# Patient Record
Sex: FEMALE | Race: WHITE | NOT HISPANIC OR LATINO
[De-identification: names, ages, dates, MRNs, and addresses within clinical notes are randomized per-mention and may not be internally consistent; named-entity substitution may affect disease eponyms.]

---

## 2018-08-15 ENCOUNTER — APPOINTMENT (OUTPATIENT)
Dept: BREAST CENTER | Facility: CLINIC | Age: 38
End: 2018-08-15
Payer: COMMERCIAL

## 2018-08-15 VITALS
SYSTOLIC BLOOD PRESSURE: 101 MMHG | HEART RATE: 69 BPM | DIASTOLIC BLOOD PRESSURE: 73 MMHG | WEIGHT: 134 LBS | BODY MASS INDEX: 24.66 KG/M2 | TEMPERATURE: 99.2 F | HEIGHT: 62 IN

## 2018-08-15 DIAGNOSIS — Z78.9 OTHER SPECIFIED HEALTH STATUS: ICD-10-CM

## 2018-08-15 PROCEDURE — 99203 OFFICE O/P NEW LOW 30 MIN: CPT

## 2019-11-27 ENCOUNTER — APPOINTMENT (OUTPATIENT)
Dept: BREAST CENTER | Facility: CLINIC | Age: 39
End: 2019-11-27
Payer: COMMERCIAL

## 2019-11-27 DIAGNOSIS — Z80.3 FAMILY HISTORY OF MALIGNANT NEOPLASM OF BREAST: ICD-10-CM

## 2019-11-27 DIAGNOSIS — Z80.7 FAMILY HISTORY OF OTHER MALIGNANT NEOPLASMS OF LYMPHOID, HEMATOPOIETIC AND RELATED TISSUES: ICD-10-CM

## 2019-11-27 DIAGNOSIS — Z80.1 FAMILY HISTORY OF MALIGNANT NEOPLASM OF TRACHEA, BRONCHUS AND LUNG: ICD-10-CM

## 2019-11-27 PROCEDURE — 99203 OFFICE O/P NEW LOW 30 MIN: CPT

## 2019-11-27 NOTE — DATA REVIEWED
[FreeTextEntry1] : 7/10/18 (R) b/l screening mammo/US showing heterogeneously dense breasts, right 12:00 periareolar 1cmFN a hypoechoic mass 6 x 3 x 6mm recommended for US guided core biopsy. Left 7:00 3cmFN showing a 6 x 3 x 6mm cyst. Left medial superior asymmetry for which spot compression views recommended and targeted US prior to biopsy. BIRADS 4 \par \par 7/24/18 (R) left dx mammo/US showing previously noted focal asymmetry blends with background parenchyma, left breast US showing no discrete mass identified, negative BIRADS 1 \par \par 7/26/18 (R) right breast US core biopsy 12:00 PA pathology consistent with nodular stromal fibrosis, concordant with imaging, recommended 6 month follow up right breast US. \par  \par

## 2019-11-27 NOTE — HISTORY OF PRESENT ILLNESS
[FreeTextEntry1] : 39 year old pre-menopausal female, previously seen by Dr. Savanah Ortiz in 8/2018 presents for consultation regarding follow up after benign biopsy completed in July 2018. DAVID of 20.2% Patient has no breast complaints. Patient is a year past due for 6 month follow up ultrasound of this area. \par \par Discussed patient's high risk status likely due to nulliparity, dense breasts and family hx, patient would like to be followed in high risk program. She is hesitant about MRIs. \par \par \par \par \par

## 2019-11-27 NOTE — PAST MEDICAL HISTORY
[Menstruating] : The patient is menstruating [Menarche Age ____] : age at menarche was [unfilled] [Definite ___ (Date)] : the last menstrual period was [unfilled] [Total Preg ___] : G[unfilled] [Live Births ___] : P[unfilled]  [FreeTextEntry5] : cervical biopsy in 2005 (reportedly benign results) [FreeTextEntry6] : NA [FreeTextEntry7] : OCP use x 20 years  [FreeTextEntry8] : NA

## 2020-01-23 ENCOUNTER — CHART COPY (OUTPATIENT)
Age: 40
End: 2020-01-23

## 2020-01-26 ENCOUNTER — TRANSCRIPTION ENCOUNTER (OUTPATIENT)
Age: 40
End: 2020-01-26

## 2020-06-26 ENCOUNTER — APPOINTMENT (OUTPATIENT)
Dept: BREAST CENTER | Facility: CLINIC | Age: 40
End: 2020-06-26

## 2020-11-02 ENCOUNTER — APPOINTMENT (OUTPATIENT)
Dept: BREAST CENTER | Facility: CLINIC | Age: 40
End: 2020-11-02
Payer: COMMERCIAL

## 2020-11-02 VITALS
RESPIRATION RATE: 16 BRPM | HEIGHT: 62 IN | TEMPERATURE: 97.8 F | SYSTOLIC BLOOD PRESSURE: 129 MMHG | HEART RATE: 88 BPM | DIASTOLIC BLOOD PRESSURE: 83 MMHG

## 2020-11-02 PROCEDURE — 99072 ADDL SUPL MATRL&STAF TM PHE: CPT

## 2020-11-02 PROCEDURE — 99214 OFFICE O/P EST MOD 30 MIN: CPT

## 2020-11-04 NOTE — DATA REVIEWED
[FreeTextEntry1] : 7/10/18 (R) b/l screening mammo/US showing heterogeneously dense breasts, right 12:00 periareolar 1cmFN a hypoechoic mass 6 x 3 x 6mm recommended for US guided core biopsy. Left 7:00 3cmFN showing a 6 x 3 x 6mm cyst. Left medial superior asymmetry for which spot compression views recommended and targeted US prior to biopsy. BIRADS 4 \par \par 7/24/18 (R) left dx mammo/US showing previously noted focal asymmetry blends with background parenchyma, left breast US showing no discrete mass identified, negative BI-RADS 1 \par \par 7/26/18 (R) right breast US core biopsy 12:00 PA pathology consistent with nodular stromal fibrosis, concordant with imaging, recommended 6 month follow up right breast US. \par \par -- 12/13/19 (R) right breast US: stable right 12:00 biopsied mass. BI-RADS 2.

## 2020-11-04 NOTE — HISTORY OF PRESENT ILLNESS
[FreeTextEntry1] : 39 year old pre-menopausal female presents for evaluation benign biopsy completed in July 2018 as well as establishing care for high risk status. Patient has no breast complaints today. Denies nipple discharge, nipple/breast skin changes or dimpling. Denies fever and chills. DAVID of 20.2%\par \par Discussed patients high risk status and recommendations for close surveillance. Patient understands and would like to be followed closely.\par \par Discussed importance and implication of genetic testing in regards to her family history and offspring. Patient would like to think about testing.\par \par Discussed benefits of surveillance and well as implication of the sensitivity of MRI. Patient has hesitations. Decided to go forward with mammo and sono first.\par \par \par \par \par

## 2020-11-16 ENCOUNTER — TRANSCRIPTION ENCOUNTER (OUTPATIENT)
Age: 40
End: 2020-11-16

## 2020-11-30 ENCOUNTER — NON-APPOINTMENT (OUTPATIENT)
Age: 40
End: 2020-11-30

## 2021-02-18 ENCOUNTER — APPOINTMENT (OUTPATIENT)
Dept: INTERNAL MEDICINE | Facility: CLINIC | Age: 41
End: 2021-02-18

## 2021-03-02 ENCOUNTER — APPOINTMENT (OUTPATIENT)
Dept: INTERNAL MEDICINE | Facility: CLINIC | Age: 41
End: 2021-03-02
Payer: COMMERCIAL

## 2021-03-02 VITALS
DIASTOLIC BLOOD PRESSURE: 81 MMHG | HEIGHT: 62 IN | SYSTOLIC BLOOD PRESSURE: 122 MMHG | OXYGEN SATURATION: 98 % | BODY MASS INDEX: 26.13 KG/M2 | WEIGHT: 142 LBS | TEMPERATURE: 98.2 F | HEART RATE: 83 BPM

## 2021-03-02 DIAGNOSIS — Z11.3 ENCOUNTER FOR SCREENING FOR INFECTIONS WITH A PREDOMINANTLY SEXUAL MODE OF TRANSMISSION: ICD-10-CM

## 2021-03-02 DIAGNOSIS — Z86.018 PERSONAL HISTORY OF OTHER BENIGN NEOPLASM: ICD-10-CM

## 2021-03-02 DIAGNOSIS — Z87.2 PERSONAL HISTORY OF DISEASES OF THE SKIN AND SUBCUTANEOUS TISSUE: ICD-10-CM

## 2021-03-02 DIAGNOSIS — Z23 ENCOUNTER FOR IMMUNIZATION: ICD-10-CM

## 2021-03-02 DIAGNOSIS — L29.0 PRURITUS ANI: ICD-10-CM

## 2021-03-02 DIAGNOSIS — Z11.59 ENCOUNTER FOR SCREENING FOR OTHER VIRAL DISEASES: ICD-10-CM

## 2021-03-02 DIAGNOSIS — Z00.00 ENCOUNTER FOR GENERAL ADULT MEDICAL EXAMINATION W/OUT ABNORMAL FINDINGS: ICD-10-CM

## 2021-03-02 DIAGNOSIS — Z83.3 FAMILY HISTORY OF DIABETES MELLITUS: ICD-10-CM

## 2021-03-02 DIAGNOSIS — R63.5 ABNORMAL WEIGHT GAIN: ICD-10-CM

## 2021-03-02 PROCEDURE — 36415 COLL VENOUS BLD VENIPUNCTURE: CPT

## 2021-03-02 PROCEDURE — 99072 ADDL SUPL MATRL&STAF TM PHE: CPT

## 2021-03-02 PROCEDURE — G0008: CPT

## 2021-03-02 PROCEDURE — 99395 PREV VISIT EST AGE 18-39: CPT | Mod: GC,25

## 2021-03-02 PROCEDURE — 90686 IIV4 VACC NO PRSV 0.5 ML IM: CPT

## 2021-03-02 RX ORDER — DESOGESTREL/ETHINYL ESTRADIOL AND ETHINYL ESTRADIOL 21-5 (28)
KIT ORAL
Refills: 0 | Status: DISCONTINUED | COMMUNITY
End: 2021-03-02

## 2021-03-02 RX ORDER — DESOGESTREL AND ETHINYL ESTRADIOL AND ETHINYL ESTRADIOL 21-5 (28)
KIT ORAL
Refills: 0 | Status: ACTIVE | COMMUNITY

## 2021-03-08 LAB
C TRACH RRNA SPEC QL NAA+PROBE: NOT DETECTED
HBV CORE IGG+IGM SER QL: NONREACTIVE
HBV SURFACE AB SER QL: REACTIVE
HBV SURFACE AG SER QL: NONREACTIVE
HCV AB SER QL: NONREACTIVE
HCV S/CO RATIO: 0.17 S/CO
HIV1+2 AB SPEC QL IA.RAPID: NONREACTIVE
N GONORRHOEA RRNA SPEC QL NAA+PROBE: NOT DETECTED
SOURCE AMPLIFICATION: NORMAL
T PALLIDUM AB SER QL IA: NEGATIVE

## 2021-04-07 ENCOUNTER — APPOINTMENT (OUTPATIENT)
Dept: MAMMOGRAPHY | Facility: HOSPITAL | Age: 41
End: 2021-04-07

## 2021-04-07 ENCOUNTER — APPOINTMENT (OUTPATIENT)
Dept: ULTRASOUND IMAGING | Facility: HOSPITAL | Age: 41
End: 2021-04-07

## 2021-05-04 ENCOUNTER — NON-APPOINTMENT (OUTPATIENT)
Age: 41
End: 2021-05-04

## 2021-05-07 ENCOUNTER — APPOINTMENT (OUTPATIENT)
Dept: BREAST CENTER | Facility: CLINIC | Age: 41
End: 2021-05-07
Payer: COMMERCIAL

## 2021-05-07 VITALS — WEIGHT: 142 LBS | HEART RATE: 95 BPM | OXYGEN SATURATION: 99 % | HEIGHT: 62 IN | BODY MASS INDEX: 26.13 KG/M2

## 2021-05-07 PROCEDURE — 99072 ADDL SUPL MATRL&STAF TM PHE: CPT

## 2021-05-07 PROCEDURE — 99213 OFFICE O/P EST LOW 20 MIN: CPT

## 2021-05-10 NOTE — PHYSICAL EXAM
[Normocephalic] : normocephalic [Examined in the supine and seated position] : examined in the supine and seated position [Symmetrical] : symmetrical [No dominant masses] : no dominant masses in right breast  [No dominant masses] : no dominant masses left breast [No Nipple Retraction] : no left nipple retraction [No Nipple Discharge] : no left nipple discharge [Supple] : supple [No Supraclavicular Adenopathy] : no supraclavicular adenopathy [No Thyromegaly] : no thyromegaly [Breast Nipple Inversion] : nipples not inverted [Breast Nipple Retraction] : nipples not retracted [Breast Nipple Flattening] : nipples not flattened [Breast Nipple Fissures] : nipples not fissured [No Axillary Lymphadenopathy] : no left axillary lymphadenopathy [No Edema] : no edema [No Rashes] : no rashes [No Ulceration] : no ulceration [de-identified] : Fibrocystic tissue in the bilateral axillary tail

## 2021-05-10 NOTE — HISTORY OF PRESENT ILLNESS
[FreeTextEntry1] : 40 year old pre-menopausal female presents for high risk screening. Patient has no breast complaints today. Denies palpable lumps, nipple discharge, nipple/breast skin changes or dimpling. Denies fever and chills. \par \par DAVID lifetime risk is 20.2%\par \par She reports that she received the 2nd dose of the COVID vaccination last week.

## 2021-05-10 NOTE — DATA REVIEWED
[FreeTextEntry1] : 7/10/18 (R) b/l screening mammo/US showing heterogeneously dense breasts, right 12:00 periareolar 1cmFN a hypoechoic mass 6 x 3 x 6mm recommended for US guided core biopsy. Left 7:00 3cmFN showing a 6 x 3 x 6mm cyst. Left medial superior asymmetry for which spot compression views recommended and targeted US prior to biopsy. BIRADS 4 \par \par 7/24/18 ([facility]) left dx mammo/US showing previously noted focal asymmetry blends with background parenchyma, left breast US showing no discrete mass identified, negative BI-RADS 1 \par \par 7/26/18 (R) right breast US core biopsy 12:00 PA pathology consistent with nodular stromal fibrosis, concordant with imaging, recommended 6 month follow up right breast US. \par \par -- 12/13/19 (R) right breast US: stable right 12:00 biopsied mass. BI-RADS 2. \par \par -- 11/25/2020 ([facility]) b/l sMmg and US: heterogenously dense breasts. No mmg or US e/o malignancy. BI-RADS 1.

## 2021-05-10 NOTE — ASSESSMENT
[FreeTextEntry1] : 39 yo female presents for high risk screening; she is without complaint. Discussed with the patient the implications for her lifetime risk, which is considered to be at high risk to develop breast cancer over the span of her lifetime and it is recommended that she undergoes high risk screening surveillance to include biannual radiological screening exams with a mammogram and screening MRI. \par \par Reviewed the benefits of genetic testing in the setting of the patients family history, as well the implications of the various results; insurance coverage and pricing was also explained. It was decided to proceed with genetic testing today ([employer]).

## 2021-05-10 NOTE — PAST MEDICAL HISTORY
[Menstruating] : The patient is menstruating [Menarche Age ____] : age at menarche was [unfilled] [Total Preg ___] : G[unfilled] [Live Births ___] : P[unfilled]  [Approximately ___] : the LMP was approximately [unfilled] [FreeTextEntry5] : cervical biopsy in 2005 (reportedly benign results) [FreeTextEntry6] : NA [FreeTextEntry7] : OCP use x 20 years  [FreeTextEntry8] : NA

## 2021-05-10 NOTE — REVIEW OF SYSTEMS
[As Noted in HPI] : as noted in HPI [Negative] : Heme/Lymph [Breast Pain] : no breast pain [Breast Lump] : no breast lump

## 2021-06-03 ENCOUNTER — NON-APPOINTMENT (OUTPATIENT)
Age: 41
End: 2021-06-03

## 2021-06-11 ENCOUNTER — NON-APPOINTMENT (OUTPATIENT)
Age: 41
End: 2021-06-11

## 2021-08-04 ENCOUNTER — NON-APPOINTMENT (OUTPATIENT)
Age: 41
End: 2021-08-04

## 2021-10-12 ENCOUNTER — NON-APPOINTMENT (OUTPATIENT)
Age: 41
End: 2021-10-12

## 2021-11-08 ENCOUNTER — NON-APPOINTMENT (OUTPATIENT)
Age: 41
End: 2021-11-08

## 2021-12-03 ENCOUNTER — APPOINTMENT (OUTPATIENT)
Dept: BREAST CENTER | Facility: CLINIC | Age: 41
End: 2021-12-03

## 2021-12-17 ENCOUNTER — APPOINTMENT (OUTPATIENT)
Dept: BREAST CENTER | Facility: CLINIC | Age: 41
End: 2021-12-17
Payer: COMMERCIAL

## 2021-12-17 VITALS — HEIGHT: 62 IN | WEIGHT: 135 LBS | HEART RATE: 82 BPM | OXYGEN SATURATION: 98 % | BODY MASS INDEX: 24.84 KG/M2

## 2021-12-17 PROCEDURE — 99213 OFFICE O/P EST LOW 20 MIN: CPT

## 2021-12-20 RX ORDER — MULTIVITAMIN
TABLET ORAL
Refills: 0 | Status: ACTIVE | COMMUNITY

## 2021-12-21 NOTE — PHYSICAL EXAM
[Normocephalic] : normocephalic [Supple] : supple [No Supraclavicular Adenopathy] : no supraclavicular adenopathy [No Thyromegaly] : no thyromegaly [Examined in the supine and seated position] : examined in the supine and seated position [Symmetrical] : symmetrical [No dominant masses] : no dominant masses in right breast  [No dominant masses] : no dominant masses left breast [No Nipple Retraction] : no left nipple retraction [No Nipple Discharge] : no left nipple discharge [No Axillary Lymphadenopathy] : no left axillary lymphadenopathy [No Edema] : no edema [No Rashes] : no rashes [No Ulceration] : no ulceration [Breast Nipple Inversion] : nipples not inverted [Breast Nipple Retraction] : nipples not retracted [Breast Nipple Flattening] : nipples not flattened [Breast Nipple Fissures] : nipples not fissured [de-identified] : Fibrocystic tissue in the bilateral axillary tail

## 2021-12-21 NOTE — HISTORY OF PRESENT ILLNESS
[FreeTextEntry1] : 41 year old pre-menopausal female presents for high risk screening. Patient has no breast complaints today. Denies palpable lumps, nipple discharge, nipple/breast skin changes or dimpling. Denies fever and chills. \par \par DAVID lifetime risk is 20.2%\par \par She reports that she received the 2nd dose of the COVID vaccination last week.

## 2021-12-21 NOTE — PAST MEDICAL HISTORY
[Menstruating] : The patient is menstruating [Menarche Age ____] : age at menarche was [unfilled] [Approximately ___] : the LMP was approximately [unfilled] [Total Preg ___] : G[unfilled] [Live Births ___] : P[unfilled]  [FreeTextEntry5] : cervical biopsy in 2005 (reportedly benign results) [FreeTextEntry6] : NA [FreeTextEntry7] : OCP use x 20 years  [FreeTextEntry8] : NA

## 2021-12-21 NOTE — DATA REVIEWED
[FreeTextEntry1] : 7/10/18 (R) b/l screening mammo/US showing heterogeneously dense breasts, right 12:00 periareolar 1cmFN a hypoechoic mass 6 x 3 x 6mm recommended for US guided core biopsy. Left 7:00 3cmFN showing a 6 x 3 x 6mm cyst. Left medial superior asymmetry for which spot compression views recommended and targeted US prior to biopsy. BIRADS 4 \par \par 7/24/18 ([facility]) left dx mammo/US showing previously noted focal asymmetry blends with background parenchyma, left breast US showing no discrete mass identified, negative BI-RADS 1 \par \par 7/26/18 (R) right breast US core biopsy 12:00 PA pathology consistent with nodular stromal fibrosis, concordant with imaging, recommended 6 month follow up right breast US. \par \par -- 12/13/19 ([facility]) right breast US: stable right 12:00 biopsied mass. BI-RADS 2. \par \par -- 11/25/2020 ([facility]) b/l sMmg and US: heterogenously dense breasts. No mmg or US e/o malignancy. BI-RADS 1. \par \par -- 6/7/21 ([facility]) MRI: 6-month f/u MRI is recommended for probably benign findings in both breasts. BI-RADS 3. \par \par -- 11/1/21 ([facility]) b/l mmg & US: heterogenously dense. BI-RADS 1.

## 2022-03-21 ENCOUNTER — APPOINTMENT (OUTPATIENT)
Dept: INTERNAL MEDICINE | Facility: CLINIC | Age: 42
End: 2022-03-21
Payer: COMMERCIAL

## 2022-03-21 VITALS
HEIGHT: 62 IN | OXYGEN SATURATION: 98 % | SYSTOLIC BLOOD PRESSURE: 112 MMHG | TEMPERATURE: 98 F | RESPIRATION RATE: 16 BRPM | DIASTOLIC BLOOD PRESSURE: 80 MMHG

## 2022-03-21 DIAGNOSIS — N63.10 UNSPECIFIED LUMP IN THE RIGHT BREAST, UNSPECIFIED QUADRANT: ICD-10-CM

## 2022-03-21 DIAGNOSIS — J30.2 OTHER SEASONAL ALLERGIC RHINITIS: ICD-10-CM

## 2022-03-21 PROCEDURE — 99395 PREV VISIT EST AGE 18-39: CPT | Mod: 25,GC

## 2022-03-21 PROCEDURE — 36415 COLL VENOUS BLD VENIPUNCTURE: CPT

## 2022-03-21 NOTE — REVIEW OF SYSTEMS
[Nasal Discharge] : nasal discharge [Negative] : Gastrointestinal [Redness] : no redness [Itching] : no itching [Sore Throat] : no sore throat [Postnasal Drip] : no postnasal drip

## 2022-03-21 NOTE — END OF VISIT
[] : Resident [FreeTextEntry3] : Here for annual visit \par Feels well overall, no acute complaints \par Follows with Breast surgeon closely, due for breast MRI in the July m continue with close follow up \par Psoriasis- needs new derm referral, will give a tthis time \par Allergies-  cw prn OTC medications as needed

## 2022-03-21 NOTE — HISTORY OF PRESENT ILLNESS
[de-identified] : This is a 41F with PMHx of R breast fibroadenoma, psoriasis, eczema, who presents for annual visit. Her last visit was 3/2021, for which she came for a psoriasis flare. Since her last visit, she has been following up with her breast surgeon office outpatient and is up to date on her mammograms and MRIs which have been benign. Her only complaint this visit is mild nasal congestion at night for which she intermittently takes sudafed which helps.

## 2022-03-22 LAB
BASOPHILS # BLD AUTO: 0.02 K/UL
BASOPHILS NFR BLD AUTO: 0.3 %
CHOLEST SERPL-MCNC: 179 MG/DL
EOSINOPHIL # BLD AUTO: 0.27 K/UL
EOSINOPHIL NFR BLD AUTO: 4.3 %
HCT VFR BLD CALC: 43.3 %
HDLC SERPL-MCNC: 58 MG/DL
HGB BLD-MCNC: 12.9 G/DL
IMM GRANULOCYTES NFR BLD AUTO: 0.3 %
LDLC SERPL CALC-MCNC: 98 MG/DL
LYMPHOCYTES # BLD AUTO: 2.18 K/UL
LYMPHOCYTES NFR BLD AUTO: 34.7 %
MAN DIFF?: NORMAL
MCHC RBC-ENTMCNC: 28 PG
MCHC RBC-ENTMCNC: 29.8 GM/DL
MCV RBC AUTO: 94.1 FL
MONOCYTES # BLD AUTO: 0.4 K/UL
MONOCYTES NFR BLD AUTO: 6.4 %
NEUTROPHILS # BLD AUTO: 3.4 K/UL
NEUTROPHILS NFR BLD AUTO: 54 %
NONHDLC SERPL-MCNC: 121 MG/DL
PLATELET # BLD AUTO: 320 K/UL
RBC # BLD: 4.6 M/UL
RBC # FLD: 13.9 %
TRIGL SERPL-MCNC: 118 MG/DL
WBC # FLD AUTO: 6.29 K/UL

## 2022-04-11 PROBLEM — Z11.59 SCREENING FOR VIRAL DISEASE: Status: ACTIVE | Noted: 2021-03-02

## 2022-05-16 ENCOUNTER — LABORATORY RESULT (OUTPATIENT)
Age: 42
End: 2022-05-16

## 2022-05-16 ENCOUNTER — APPOINTMENT (OUTPATIENT)
Dept: DERMATOLOGY | Facility: CLINIC | Age: 42
End: 2022-05-16
Payer: COMMERCIAL

## 2022-05-16 DIAGNOSIS — D48.5 NEOPLASM OF UNCERTAIN BEHAVIOR OF SKIN: ICD-10-CM

## 2022-05-16 DIAGNOSIS — D48.7 NEOPLASM OF UNCERTAIN BEHAVIOR OF OTHER SPECIFIED SITES: ICD-10-CM

## 2022-05-16 PROCEDURE — 11102 TANGNTL BX SKIN SINGLE LES: CPT

## 2022-05-16 PROCEDURE — 99204 OFFICE O/P NEW MOD 45 MIN: CPT | Mod: 25

## 2022-05-16 PROCEDURE — 11103 TANGNTL BX SKIN EA SEP/ADDL: CPT | Mod: 59

## 2022-05-16 NOTE — HISTORY OF PRESENT ILLNESS
[FreeTextEntry1] : psoriasis, mole on neck [de-identified] : new patient\par no hx skin cancer\par mole on back of neck entire life, recently at a wedding and irritated by paste\par psoriasis since kid, used to be on shins and elbows now just ankles where rubs when running\par one episode of joint pain in college but not thought to be psoriatic arthritis

## 2022-06-03 ENCOUNTER — NON-APPOINTMENT (OUTPATIENT)
Age: 42
End: 2022-06-03

## 2022-06-13 ENCOUNTER — TRANSCRIPTION ENCOUNTER (OUTPATIENT)
Age: 42
End: 2022-06-13

## 2022-06-20 ENCOUNTER — TRANSCRIPTION ENCOUNTER (OUTPATIENT)
Age: 42
End: 2022-06-20

## 2022-07-15 ENCOUNTER — APPOINTMENT (OUTPATIENT)
Dept: BREAST CENTER | Facility: CLINIC | Age: 42
End: 2022-07-15

## 2022-07-15 VITALS — HEART RATE: 81 BPM | OXYGEN SATURATION: 98 % | HEIGHT: 62 IN | BODY MASS INDEX: 25.21 KG/M2 | WEIGHT: 137 LBS

## 2022-07-15 PROCEDURE — 99213 OFFICE O/P EST LOW 20 MIN: CPT

## 2022-07-15 RX ORDER — COVID-19 MOLECULAR TEST ASSAY
KIT MISCELLANEOUS
Qty: 4 | Refills: 0 | Status: ACTIVE | COMMUNITY
Start: 2022-02-15

## 2022-07-15 NOTE — ASSESSMENT
[FreeTextEntry1] : 40 yo female presents for high risk screening; she is without complaint. Discussed with the patient the implications for her lifetime risk, which is considered to be at high risk to develop breast cancer over the span of her lifetime and it is recommended that she undergoes high risk screening surveillance to include biannual radiological screening exams with a mammogram and screening MRI. She will be due for her screening mammogram/US in November & RTC in 6 months. \par \par She has her annual appointment with her GYN next week. States that [facility] performs breast exams. I reviewed that if her mammo/US in November is benign, she will RTC in 6 months at which point I will order her MRI, and next mammo/US with an annual visit-- alternating biannual breast exams with the GYN. The patient verbalized understanding and is in agreement with the plan.

## 2022-07-15 NOTE — PHYSICAL EXAM
[Normocephalic] : normocephalic [Supple] : supple [No Supraclavicular Adenopathy] : no supraclavicular adenopathy [No Thyromegaly] : no thyromegaly [Examined in the supine and seated position] : examined in the supine and seated position [Symmetrical] : symmetrical [No dominant masses] : no dominant masses in right breast  [No dominant masses] : no dominant masses left breast [No Nipple Retraction] : no left nipple retraction [No Nipple Discharge] : no left nipple discharge [No Axillary Lymphadenopathy] : no left axillary lymphadenopathy [No Edema] : no edema [No Rashes] : no rashes [No Ulceration] : no ulceration [Breast Nipple Inversion] : nipples not inverted [Breast Nipple Retraction] : nipples not retracted [Breast Nipple Flattening] : nipples not flattened [Breast Nipple Fissures] : nipples not fissured [de-identified] : Fibrocystic tissue in the bilateral axillary tail

## 2022-07-15 NOTE — HISTORY OF PRESENT ILLNESS
[FreeTextEntry1] : 41 year old premenopausal female presents for high risk screening. Patient has no breast complaints today. Denies palpable lumps, nipple discharge, nipple/breast skin changes or dimpling. Denies fever and chills. \par \par DAVID lifetime risk is 20.2% genetic testing in 2021 was negative.

## 2022-07-15 NOTE — DATA REVIEWED
[FreeTextEntry1] : 7/10/18 ([facility]) b/l screening mammo/US showing heterogeneously dense breasts, right 12:00 periareolar 1cmFN a hypoechoic mass 6 x 3 x 6mm recommended for US guided core biopsy. Left 7:00 3cmFN showing a 6 x 3 x 6mm cyst. Left medial superior asymmetry for which spot compression views recommended and targeted US prior to biopsy. BIRADS 4 \par \par 7/24/18 (R) left dx mammo/US showing previously noted focal asymmetry blends with background parenchyma, left breast US showing no discrete mass identified, negative BI-RADS 1 \par \par 7/26/18 (R) right breast US core biopsy 12:00 PA pathology consistent with nodular stromal fibrosis, concordant with imaging, recommended 6 month follow up right breast US. \par \par -- 12/13/19 ([facility]) right breast US: stable right 12:00 biopsied mass. BI-RADS 2. \par \par -- 11/25/2020 ([facility]) b/l sMmg and US: heterogenously dense breasts. No mmg or US e/o malignancy. BI-RADS 1. \par \par -- 6/7/21 ([facility]) MRI: 6-month f/u MRI is recommended for probably benign findings in both breasts. BI-RADS 3. \par \par -- 11/1/21 ([facility]) b/l mmg & US: heterogenously dense. BI-RADS 1. \par \par -- 6/7/22 ([facility]) MRI: No e/o malignancy. BI-RADS 1.

## 2022-10-20 ENCOUNTER — APPOINTMENT (OUTPATIENT)
Dept: DERMATOLOGY | Facility: CLINIC | Age: 42
End: 2022-10-20

## 2022-10-20 PROCEDURE — 99213 OFFICE O/P EST LOW 20 MIN: CPT

## 2022-10-20 NOTE — HISTORY OF PRESENT ILLNESS
[FreeTextEntry1] : angela mole [de-identified] : irritated nevus with features of recurrent right thigh on bx\par here to recheck site, denies prior bx or trauma

## 2022-10-20 NOTE — PHYSICAL EXAM
[Alert] : alert [Oriented x 3] : ~L oriented x 3 [Well Nourished] : well nourished [Conjunctiva Non-injected] : conjunctiva non-injected [No Visual Lymphadenopathy] : no visual  lymphadenopathy [No Clubbing] : no clubbing [No Edema] : no edema [No Bromhidrosis] : no bromhidrosis [No Chromhidrosis] : no chromhidrosis [FreeTextEntry3] : right thigh flat white scar with <1 mm brown macule within

## 2022-11-09 ENCOUNTER — TRANSCRIPTION ENCOUNTER (OUTPATIENT)
Age: 42
End: 2022-11-09

## 2022-11-09 ENCOUNTER — NON-APPOINTMENT (OUTPATIENT)
Age: 42
End: 2022-11-09

## 2022-11-16 ENCOUNTER — NON-APPOINTMENT (OUTPATIENT)
Age: 42
End: 2022-11-16

## 2022-11-16 ENCOUNTER — APPOINTMENT (OUTPATIENT)
Dept: BREAST CENTER | Facility: CLINIC | Age: 42
End: 2022-11-16

## 2022-11-16 VITALS
RESPIRATION RATE: 16 BRPM | BODY MASS INDEX: 25.21 KG/M2 | WEIGHT: 137 LBS | TEMPERATURE: 98.3 F | OXYGEN SATURATION: 100 % | SYSTOLIC BLOOD PRESSURE: 123 MMHG | HEIGHT: 62 IN | HEART RATE: 80 BPM | DIASTOLIC BLOOD PRESSURE: 87 MMHG

## 2022-11-16 PROCEDURE — 99213 OFFICE O/P EST LOW 20 MIN: CPT

## 2022-11-16 NOTE — DATA REVIEWED
[FreeTextEntry1] : 7/10/18 ([facility]) b/l screening mammo/US showing heterogeneously dense breasts, right 12:00 periareolar 1cmFN a hypoechoic mass 6 x 3 x 6mm recommended for US guided core biopsy. Left 7:00 3cmFN showing a 6 x 3 x 6mm cyst. Left medial superior asymmetry for which spot compression views recommended and targeted US prior to biopsy. BIRADS 4 \par \par 7/24/18 ([facility]) left dx mammo/US showing previously noted focal asymmetry blends with background parenchyma, left breast US showing no discrete mass identified, negative BI-RADS 1 \par \par 7/26/18 ([facility]) right breast US core biopsy 12:00 PA pathology consistent with nodular stromal fibrosis, concordant with imaging, recommended 6 month follow up right breast US. \par \par -- 12/13/19 ([facility]) right breast US: stable right 12:00 biopsied mass. BI-RADS 2. \par \par -- 11/25/2020 ([facility]) b/l sMmg and US: heterogenously dense breasts. No mmg or US e/o malignancy. BI-RADS 1. \par \par -- 6/7/21 ([facility]) MRI: 6-month f/u MRI is recommended for probably benign findings in both breasts. BI-RADS 3. \par \par -- 11/1/21 ([facility]) b/l mmg & US: heterogenously dense. BI-RADS 1. \par \par -- 6/7/22 ([facility]) MRI: No e/o malignancy. BI-RADS 1. \par \par 11/2/22 ([facility]) b/l sMmg and US: heterogenously dense. No suspicious findings or significant change. BI-RADS 1.

## 2022-11-16 NOTE — PAST MEDICAL HISTORY
[FreeTextEntry5] : cervical biopsy in 2005 (reportedly benign results) [FreeTextEntry6] : NA [FreeTextEntry7] : OCP use x 20 years  [FreeTextEntry8] : NA

## 2022-11-16 NOTE — ASSESSMENT
[FreeTextEntry1] : 43 yo female presents for high risk screening; she is without complaint. Discussed with the patient the implications for her lifetime risk, which is considered to be at high risk to develop breast cancer over the span of her lifetime and it is recommended that she undergoes high risk screening surveillance to include biannual radiological screening exams with a mammogram and screening MRI. She was advised to proceed with her breast exam with her GYN, likely in May 2023, and follow-up with her MRI in June. If benign, RTC December 2023, same day as her mammo and US. Patient verbalized understanding and is in agreement with the plan.

## 2022-11-16 NOTE — HISTORY OF PRESENT ILLNESS
[FreeTextEntry1] : 42 year old premenopausal female presents for high risk screening. Patient has no breast complaints today. Denies palpable lumps, nipple discharge, nipple/breast skin changes or dimpling. Denies fever and chills. \par \par DAVID lifetime risk is 20.2% genetic testing in 2021 was negative.

## 2022-11-16 NOTE — PHYSICAL EXAM
[Breast Nipple Inversion] : nipples not inverted [Breast Nipple Retraction] : nipples not retracted [Breast Nipple Flattening] : nipples not flattened [Breast Nipple Fissures] : nipples not fissured [de-identified] : Fibrocystic tissue in the bilateral axillary tail

## 2023-01-20 ENCOUNTER — APPOINTMENT (OUTPATIENT)
Dept: BREAST CENTER | Facility: CLINIC | Age: 43
End: 2023-01-20

## 2023-05-18 ENCOUNTER — APPOINTMENT (OUTPATIENT)
Dept: DERMATOLOGY | Facility: CLINIC | Age: 43
End: 2023-05-18
Payer: COMMERCIAL

## 2023-05-18 ENCOUNTER — LABORATORY RESULT (OUTPATIENT)
Age: 43
End: 2023-05-18

## 2023-05-18 DIAGNOSIS — L25.8 UNSPECIFIED CONTACT DERMATITIS DUE TO OTHER AGENTS: ICD-10-CM

## 2023-05-18 DIAGNOSIS — D48.9 NEOPLASM OF UNCERTAIN BEHAVIOR, UNSPECIFIED: ICD-10-CM

## 2023-05-18 PROCEDURE — 11102 TANGNTL BX SKIN SINGLE LES: CPT

## 2023-05-18 PROCEDURE — 99214 OFFICE O/P EST MOD 30 MIN: CPT | Mod: 25

## 2023-05-18 RX ORDER — MUPIROCIN 20 MG/G
2 OINTMENT TOPICAL
Qty: 1 | Refills: 11 | Status: ACTIVE | COMMUNITY
Start: 2023-05-18 | End: 1900-01-01

## 2023-05-18 NOTE — HISTORY OF PRESENT ILLNESS
[FreeTextEntry1] : fu moles [de-identified] : HX irritated nevus with features of recurrent nevus right thigh on bx/denies prior bx or trauma - chose to monitor\par no new or changing since LV\par going to [place] in [place] for summer

## 2023-05-18 NOTE — PHYSICAL EXAM
[Alert] : alert [Oriented x 3] : ~L oriented x 3 [Well Nourished] : well nourished [Conjunctiva Non-injected] : conjunctiva non-injected [No Visual Lymphadenopathy] : no visual  lymphadenopathy [No Clubbing] : no clubbing [No Edema] : no edema [No Bromhidrosis] : no bromhidrosis [No Chromhidrosis] : no chromhidrosis [Full Body Skin Exam Performed] : performed [FreeTextEntry3] : white skin\par white scar no pigment right thigh\par prominent brown thin papule with darker pigment network overlying R medial chest\par innumerable brown macules and thin papules

## 2023-06-14 ENCOUNTER — NON-APPOINTMENT (OUTPATIENT)
Age: 43
End: 2023-06-14

## 2023-07-07 ENCOUNTER — TRANSCRIPTION ENCOUNTER (OUTPATIENT)
Age: 43
End: 2023-07-07

## 2023-12-05 ENCOUNTER — NON-APPOINTMENT (OUTPATIENT)
Age: 43
End: 2023-12-05

## 2023-12-05 ENCOUNTER — APPOINTMENT (OUTPATIENT)
Dept: BREAST CENTER | Facility: CLINIC | Age: 43
End: 2023-12-05
Payer: COMMERCIAL

## 2023-12-05 VITALS
WEIGHT: 145 LBS | DIASTOLIC BLOOD PRESSURE: 84 MMHG | SYSTOLIC BLOOD PRESSURE: 123 MMHG | HEART RATE: 97 BPM | BODY MASS INDEX: 26.68 KG/M2 | HEIGHT: 62 IN

## 2023-12-05 DIAGNOSIS — R92.8 OTHER ABNORMAL AND INCONCLUSIVE FINDINGS ON DIAGNOSTIC IMAGING OF BREAST: ICD-10-CM

## 2023-12-05 DIAGNOSIS — Z91.89 OTHER SPECIFIED PERSONAL RISK FACTORS, NOT ELSEWHERE CLASSIFIED: ICD-10-CM

## 2023-12-05 DIAGNOSIS — Z80.3 FAMILY HISTORY OF MALIGNANT NEOPLASM OF BREAST: ICD-10-CM

## 2023-12-05 PROCEDURE — 99213 OFFICE O/P EST LOW 20 MIN: CPT

## 2024-05-02 ENCOUNTER — NON-APPOINTMENT (OUTPATIENT)
Age: 44
End: 2024-05-02

## 2024-05-08 ENCOUNTER — APPOINTMENT (OUTPATIENT)
Dept: DERMATOLOGY | Facility: CLINIC | Age: 44
End: 2024-05-08
Payer: COMMERCIAL

## 2024-05-08 DIAGNOSIS — D22.9 MELANOCYTIC NEVI, UNSPECIFIED: ICD-10-CM

## 2024-05-08 DIAGNOSIS — L81.4 OTHER MELANIN HYPERPIGMENTATION: ICD-10-CM

## 2024-05-08 DIAGNOSIS — L40.9 PSORIASIS, UNSPECIFIED: ICD-10-CM

## 2024-05-08 DIAGNOSIS — Z12.83 ENCOUNTER FOR SCREENING FOR MALIGNANT NEOPLASM OF SKIN: ICD-10-CM

## 2024-05-08 PROCEDURE — 99214 OFFICE O/P EST MOD 30 MIN: CPT

## 2024-05-08 RX ORDER — BETAMETHASONE DIPROPIONATE 0.5 MG/G
0.05 CREAM, AUGMENTED TOPICAL
Qty: 1 | Refills: 11 | Status: ACTIVE | COMMUNITY
Start: 2022-05-16 | End: 1900-01-01

## 2024-05-08 NOTE — HISTORY OF PRESENT ILLNESS
[FreeTextEntry1] : fu moles [de-identified] : lv may 2023-  irritated mole bx on chest no new or changing  HX irritated nevus with features of recurrent nevus right thigh on bx/denies prior bx or trauma - chose to monitor -NER clinically

## 2024-05-08 NOTE — PHYSICAL EXAM
[Alert] : alert [Oriented x 3] : ~L oriented x 3 [Well Nourished] : well nourished [Conjunctiva Non-injected] : conjunctiva non-injected [No Visual Lymphadenopathy] : no visual  lymphadenopathy [No Clubbing] : no clubbing [No Edema] : no edema [No Bromhidrosis] : no bromhidrosis [No Chromhidrosis] : no chromhidrosis [Full Body Skin Exam Performed] : performed [FreeTextEntry3] : white skin white scar no pigment right thigh innumerable brown macules and thin papules  psoriasis plaque right medial ankle

## 2024-06-05 ENCOUNTER — TRANSCRIPTION ENCOUNTER (OUTPATIENT)
Age: 44
End: 2024-06-05

## 2025-01-09 ENCOUNTER — APPOINTMENT (OUTPATIENT)
Dept: BREAST CENTER | Facility: CLINIC | Age: 45
End: 2025-01-09

## 2025-07-03 ENCOUNTER — NON-APPOINTMENT (OUTPATIENT)
Age: 45
End: 2025-07-03

## 2025-07-08 ENCOUNTER — TRANSCRIPTION ENCOUNTER (OUTPATIENT)
Age: 45
End: 2025-07-08

## 2025-08-11 ENCOUNTER — NON-APPOINTMENT (OUTPATIENT)
Age: 45
End: 2025-08-11

## 2025-08-12 ENCOUNTER — APPOINTMENT (OUTPATIENT)
Dept: DERMATOLOGY | Facility: CLINIC | Age: 45
End: 2025-08-12
Payer: COMMERCIAL

## 2025-08-12 DIAGNOSIS — Z86.03 PERSONAL HISTORY OF NEOPLASM OF UNCERTAIN BEHAVIOR: ICD-10-CM

## 2025-08-12 DIAGNOSIS — Z86.018 PERSONAL HISTORY OF OTHER BENIGN NEOPLASM: ICD-10-CM

## 2025-08-12 DIAGNOSIS — L25.8 UNSPECIFIED CONTACT DERMATITIS DUE TO OTHER AGENTS: ICD-10-CM

## 2025-08-12 DIAGNOSIS — Z12.83 ENCOUNTER FOR SCREENING FOR MALIGNANT NEOPLASM OF SKIN: ICD-10-CM

## 2025-08-12 DIAGNOSIS — L40.9 PSORIASIS, UNSPECIFIED: ICD-10-CM

## 2025-08-12 DIAGNOSIS — D22.9 MELANOCYTIC NEVI, UNSPECIFIED: ICD-10-CM

## 2025-08-12 DIAGNOSIS — L82.1 OTHER SEBORRHEIC KERATOSIS: ICD-10-CM

## 2025-08-12 DIAGNOSIS — L81.4 OTHER MELANIN HYPERPIGMENTATION: ICD-10-CM

## 2025-08-12 PROCEDURE — 99214 OFFICE O/P EST MOD 30 MIN: CPT

## 2025-09-26 PROBLEM — Z12.39 BREAST CANCER SCREENING: Status: ACTIVE | Noted: 2025-09-26

## 2025-09-26 PROBLEM — Z91.89 AT HIGH RISK FOR BREAST CANCER: Status: RESOLVED | Noted: 2019-11-27 | Resolved: 2025-09-26

## 2025-09-26 PROBLEM — R92.30 DENSE BREAST TISSUE: Status: ACTIVE | Noted: 2025-09-26
